# Patient Record
Sex: FEMALE | Race: BLACK OR AFRICAN AMERICAN
[De-identification: names, ages, dates, MRNs, and addresses within clinical notes are randomized per-mention and may not be internally consistent; named-entity substitution may affect disease eponyms.]

---

## 2018-10-30 NOTE — HP
Admitting History and Physical





- Primary Care Physician


PCP: Issa Keith





- Admission


Chief Complaint: right breast cancer


History of Present Illness: 





Patient is a 69 yo female who was noted to have right density at 3 o'clock on 

mammo and US.  Patient underwent a core bx on 2018 which was c/w a 1.2 cm 

invasive ductal ER pos, MT and HER 2 negative.  The MRI done 10/17/2018 was c/w 

known cancer without evidence of multifocal or contralateral dz.  The patient 

is now presenting for right breast we with nl, snbx, poss andx and lympho.  


History Source: Patient





- Past Medical History


Cardiovascular: Yes: Hyperlipdemia





- Smoking History


Smoking history: Never smoked


Have you smoked in the past 12 months: No





- Alcohol/Substance Use


Hx Alcohol Use: No





Home Medications





- Allergies


Allergies/Adverse Reactions: 


 Allergies











Allergy/AdvReac Type Severity Reaction Status Date / Time


 


No Known Allergies Allergy   Verified 10/17/18 10:11














- Home Medications


Home Medications: 


Ambulatory Orders





Ascorbic Acid [Vitamin C] 500 mg PO DAILY 10/25/18 


Aspirin [Adult Aspirin] 81 mg PO DAILY 10/25/18 


Cholecalciferol (Vitamin D3) [Vitamin D] 2,000 unit PO DAILY 10/25/18 


Zinc Gluconate [Zinc] 50 mg PO DAILY 10/25/18 











Family Disease History





- Family Disease History


Family Disease History: CA: Father (prostate ca  70 ), Daughter (breast 

ca 43 now 46 BRCA negative)





Review of Systems





- Review of Systems


Constitutional: reports: No Symptoms


HENT: reports: Ringing in Ears


Cardiovascular: reports: No Symptoms


Respiratory: reports: No Symptoms


Musculoskeletal: reports: Back Pain





Physical Examination


Constitutional: Yes: Well Nourished, Calm


Breast(s): Yes: Other (Ptotic D-cup breast without skin changes or nipple 

discharge. No suspicious masses or adenopathy was noted bilaterally)





Problem List





- Problems


(1) Breast cancer, right


Code(s): C50.911 - MALIGNANT NEOPLASM OF UNSP SITE OF RIGHT FEMALE BREAST   


Qualifiers: 


   Breast location: upper inner quadrant of breast   Estrogen receptor status: 

positive   Patient sex: female   Qualified Code(s): C50.211 - Malignant 

neoplasm of upper-inner quadrant of right female breast; Z17.0 - Estrogen 

receptor positive status [ER+]   





Assessment/Plan





Plan


right breast we with nl, snbx, lympho, possible andx

## 2018-11-01 ENCOUNTER — HOSPITAL ENCOUNTER (OUTPATIENT)
Dept: HOSPITAL 74 - FASU | Age: 68
Discharge: HOME | End: 2018-11-01
Attending: SURGERY
Payer: COMMERCIAL

## 2018-11-01 VITALS — TEMPERATURE: 98.1 F

## 2018-11-01 VITALS — SYSTOLIC BLOOD PRESSURE: 121 MMHG | HEART RATE: 65 BPM | DIASTOLIC BLOOD PRESSURE: 78 MMHG

## 2018-11-01 VITALS — BODY MASS INDEX: 35.5 KG/M2

## 2018-11-01 DIAGNOSIS — C50.211: Primary | ICD-10-CM

## 2018-11-01 DIAGNOSIS — Z17.0: ICD-10-CM

## 2018-11-01 PROCEDURE — 0JX60ZC TRANSFER CHEST SUBCUTANEOUS TISSUE AND FASCIA WITH SKIN, SUBCUTANEOUS TISSUE AND FASCIA, OPEN APPROACH: ICD-10-PCS | Performed by: SURGERY

## 2018-11-01 PROCEDURE — 0HBT0ZZ EXCISION OF RIGHT BREAST, OPEN APPROACH: ICD-10-PCS | Performed by: SURGERY

## 2018-11-01 PROCEDURE — A9541 TC99M SULFUR COLLOID: HCPCS

## 2018-11-01 NOTE — OP
DATE OF OPERATION:  11/01/2018

 

PREOPERATIVE DIAGNOSIS:  Right breast cancer, overlapping regions.

 

POSTOPERATIVE DIAGNOSIS:  Right breast cancer, overlapping regions.

 

PROCEDURES:  Right breast partial mastectomy with mammographic needle localization

and right axillary sentinel lymph node biopsy with tissue transfer closure, 4 x 3 cm.

 

ANESTHESIA:  General laryngeal mask airway anesthesia.

 

PRIMARY SURGEON:  Stu Keith MD

 

FIRST ASSIST:  ALEKS Gracia

 

COMPLICATIONS:  None.

 

Briefly, the patient is a 68-year-old, G5, P5, postmenopausal female with 

American/[place] descent.  She has a family history with her daughter had breast

cancer at age 43 and her sister had ovarian cancer at age 60.  Her father had

prostate cancer.  The patient was found to have a mass on the medial aspect of the

right breast on screening mammography in September 2018.  An ultrasound showed a

1.2-cm density at the right breast 3 o'clock region, 6 cm from the nipple. 

Ultrasound-guided core biopsy showed a moderately differentiated, invasive duct

cancer which was ER positive of 5%, NC negative, and HER2/yuliet negative.  Ki-67 was

10% to 12%.  MRI showed localized disease, and she underwent genetic testing, and the

breast _____ test was negative.  The patient was advised undergoing a right breast

partial mastectomy and sentinel lymph node biopsy.  The patient was brought in for

the procedure on November 1, 2018.  She first underwent the lymphoscintigraphy and

needle localization of the clip at [facility], then was brought to

the [facility].  In the holding area, site verification was made, and

informed consent was obtained.

 

She was brought into the operating room and laid on the OR table in the supine

position.  Venodynes were placed on the lower extremities.  She did not receive any

IV antibiotics given the small nature of the excision.  She underwent general

laryngeal mask airway anesthesia, and the right breast was sterilely prepped and

draped in the usual fashion with the wire prepped in the field.  Timeout was

performed.  Next, 3 mL of Lymphazurin blue were injected intradermally around the

right breast medial para-areolar region, and massage was instituted.

 

The sentinel lymph node biopsy was performed by making an incision just below the

hair-bearing area of the right axilla, and dissection was undertaken and a blue

lymphatic was easily seen coursing to a blue hot lymph node in the level I region of

the right axilla with a 10-second gamma count of 2192.  No other blue or hot nodes

were found, and background count after removal of this node was 18.  Hemostasis was

achieved, and the axillary wound was closed using interrupted 2-0 plain suture.  The

skin was closed using interrupted 3-0 deep dermal Vicryl suture and a running 4-0

subcuticular Biosyn suture.

 

At this point, the wide excision was undertaken around the medial aspect of the right

breast through a curvilinear incision.  Dissection was undertaken around the needle

localization, and the breast tissue was completely removed from around the wire with

the specimen in the middle of the specimen.  The specimen was oriented with a long

lateral/short superior suture, and specimen radiographs showed removal of the clip in

question.  Hemostasis was achieved.  Separate margins were then taken on the

superior, inferior, medial, lateral, deep, and anterior aspects with the suture

marking the biopsy-cavity side.  All these specimens were each sent separately to

Pathology in formalin.  Hemostasis was achieved, and a 4 x 3 cm tissue transfer

closure was accomplished by undermining the breast tissue and bringing it into the

wound and reapproximating the breast tissue using interrupted 2-0 plain suture.  The

skin was closed using interrupted 3-0 deep dermal Vicryl suture and a running 4-0

subcuticular Biosyn suture.  Mastisol and Steri-Strips were applied over the wounds,

and compressive dressing placed over this.  She was placed in a surgical bra

postoperatively.  The patient had laryngeal mask airway removed at the end of the

case and will be recovered in the postanesthesia care unit.  She will be discharged

home the same day once discharge criteria are met.  Estimated blood loss was about 20

mL, and she was hemodynamically stable throughout.  She will follow up in the office

in 1 week for formal wound pathology check.

 

 

STU KEITH M.D.

 

[id number]

DD: 11/01/2018 13:44

DT: 11/01/2018 14:42

Job #:  [id number]

## 2018-11-07 NOTE — PATH
Surgical Pathology Report



Patient Name:  NIRANJAN LUBIN

Accession #:  [id number]

Med. Rec. #:  [id number]                                                        

   /Age/Gender:  1950 (Age: 68) / F

Account:  [id number]                                                          

             Location: [facility] AMBULATORY 

Taken:  2018

Received:  2018

Reported:  2018

Physicians:  Issa Keith M.D.

  



Specimen(s) Received

A: RIGHT AXILLARY SENTINEL LYMPH NODE #1 

B: RIGHT BREAST WIDE EXCISION 

C: RIGHT BREAST ANTERIOR MARGIN 

D: RIGHT BREAST POSTERIOR MARGIN 

E: RIGHT BREAST MEDIAL MARGIN 

F: RIGHT BREAST LATERAL MARGIN 

G: RIGHT BREAST INFERIOR MARGIN 

H: RIGHT BREAST SUPERIOR MARGIN 





Clinical History

Rt breast invasive Ca







Final Diagnosis

A. lymph node, right axillary sentinel #1, excision:

One lymph node, negative for metastatic carcinoma (0/1).



B. breast, right, wide excision:

Invasive ductal carcinoma, moderately differentiated (tubule score: 3/3, nuclear

grade: 2/3, mitotic score: 2/3; total score: 7/9, Adalgisa grade 2).

Invasive carcinoma measures 1.0 cm in greatest dimension, microscopically.

Focal ductal carcinoma in situ (DCIS), solid type, intermediate nuclear grade.

Invasive carcinoma is close to (< 1 mm) the anterior margin.

DCIS is at 1.0 cm from the anterior margin.

see specimens C-H for final margins.

No lymphovascular invasion is identified.

Pathologic stage (pTNM):pT1b pN0.

see also invasive carcinoma case Summary below.



C. breast, right, anterior margin, excision:

Benign breast tissue.



D. breast, right, posterior margin, excision:

Benign breast tissue and skeletal muscle.



E. breast, right, medial margin, excision:

Benign breast tissue.



F. breast, right, lateral margin, excision:

Benign breast tissue.



G. breast, right, inferior margin, excision:

Benign breast tissue.



H. breast, right, superior margin, excision:

Benign breast tissue.



Comments

Breast Invasive Carcinoma: Surgical Pathology Case Summary

(Based on AJCC TNM 8 th edition) 



Procedure 

_X_ Excision (less than total mastectomy)



Specimen Laterality

_X_ Right



Tumor Size 

_X_ Greatest dimension of largest invasive focus >1 mm (millimeters): 10 mm     





Histologic Type

_X_ Invasive carcinoma of no special type (ductal, not otherwise specified)



Histologic Grade (Adalgisa Histologic Score) 



Glandular (Acinar)/Tubular Differentiation

_X_ Score 3 (<10% of tumor area forming glandular/tubular structures)



Nuclear Pleomorphism

     _X_ Score 2 



Mitotic Rate

_X_ Score 2 



Overall Grade

_X_ Grade 2 (scores of 6 or 7) 



Tumor Focality 

 _X_ Single focus of invasive carcinoma



Ductal Carcinoma In Situ (DCIS) 

_X_ DCIS is present in specimen

      _X_ Negative for extensive intraductal component (EIC) 





Margins 

Invasive Carcinoma Margins 

_X_ Uninvolved by invasive carcinoma

     Distance from closest margin (millimeters):  < 1 mm from anterior margin in

wide excision B; final anterior margin C is negative for carcinoma. 



DCIS Margins 

_X_ Uninvolved by DCIS

     Distance from closest margin (millimeters): 1 cm from anterior margin in

wide excision B; final anterior margin C is negative for DCIS. 



Regional Lymph Nodes 

Number of Lymph Nodes with Macrometastases (>2 mm): 0

Number of Lymph Nodes with Micrometastases (>0.2 mm to 2 mm and/or >200 cells):

0

Number of Lymph Nodes with Isolated Tumor Cells (=0.2 mm and =200 cells): 0



Number of Lymph Nodes Examined: 1

Number of [place] Nodes Examined : 1



Treatment Effect 

_X_ No known presurgical therapy



 Lymphovascular Invasion 

 _X_ Not identified



Pathologic Stage Classification (pTNM, AJCC 8th Edition) 

Primary Tumor (Invasive Carcinoma) (pT) 

     _X_ pT1b:     Tumor >5 mm but =10 mm in greatest dimension



Regional Lymph Nodes (pN)

Modifier (required only if applicable)

Category (pN)

     _X_ pN0 (sn):     No regional lymph node metastasis identified or ITCs only

     

Biomarker Studies 

Results of ER and UT studies performed on this specimen (block B4) at [facility] are as follows:

ER (clone [id number] mouse monoclonal antibody by [employer]): 0 % nuclear staining

(Negative).

UT (clone16 mouse monoclonal antibody by [employer]) : 0 % nuclear staining

(Negative).



Results of Her2 (IHC) & Ki-67 studies performed on this specimen (block B4) at

[place], [place] ([id number]) are as follows:

Her2 IHC (EP3 from [employer], formerly known as [id number], using Bond Polymer Refine

detection kit): 0 (Negative).

Ki67: ~20% (intermediate proliferative index).



Positive and negative controls (internal if applicable) show appropriate

results.

Formalin fixation and cold ischemic times are within current ASCO/CAP

recommendations for ER, UT and Her2 testing.



***Electronically Signed***

Trini Rosales M.D.





Gross Description

A. Received in formalin, labeled "right axillary sentinel lymph node #1" is a

2.4 x 0.9 x 0.5 cm lymph node with attached fatty tissue. The lymph node is

bisected and entirely submitted in two cassettes.



B.Received in formalin, labeled "right breast wide excision" is a 7.2 x 5.8 x

2.6 cm portion of fibrofatty tissue with a localizing wire. A long suture

designates the lateral margin and a short suture indicates the superior margin,

per the surgeon. The specimen is inked as follows: anterior-red,

posterior-black, superior-blue, inferior-green, lateral-yellow, medial-orange.

Sectioning reveals a 1.3 x 1.2 x 0.8 cm firm tan mass abutting the anterior

margin, at 1.0 cm from the posterior margin, 1.4 cm from the inferior margin,

1.6 cm from the medial margin and > 2 cm from the superior and lateral margins.

The remainder of the breast tissue is comprised predominantly of adipose tissue.

Representative sections are submitted in nine cassettes as follows: 1-3-mass

with anterior margin; 4-mass with anterior and medial margins, 5-anterior and

lateral margins, 6&7- lateral and posterior margins, 8-inferior margin,

9-superior margin.

Time to formalin fixation: 22 minutes

Total formalin fixation time: approximately 30 hours



C. Received in formalin, labeled "right breast anterior margin" is a 3 x 2.3 x

0.5 cm portion of fibrofatty tissue. A suture marks the biopsy cavity side, per

the surgeon. The margin opposite the suture is inked green. The specimen is

sectioned and entirely submitted in three cassettes.



D. Received in formalin, labeled "right breast posterior margin" is a 2.8 x 1.5

x 0.8 cm portion of fibrofatty tissue. A suture marks the biopsy cavity side,

per the surgeon. The margin opposite the suture is inked green. The specimen is

sectioned and entirely submitted in three cassettes.



E. Received in formalin, labeled "right breast medial margin" is a 2.8 x 1.5 x

0.9 cm portion of fibrofatty tissue. A suture marks the biopsy cavity side, per

the surgeon. The margin opposite the suture is inked green. The specimen is

sectioned and entirely submitted in three cassettes.

 

F. Received in formalin, labeled "right breast lateral margin" is a 3.0 x 1.5 x

0.8 cm portion of fibrofatty tissue. A suture marks the biopsy cavity side, per

the surgeon. The margin opposite the suture is inked green. The specimen is

sectioned and entirely submitted in three cassettes.

 

G. Received in formalin, labeled "right breast inferior margin" is a 3.8 x 2 x

0.9 cm portion of fibrofatty tissue. A suture marks the biopsy cavity side, per

the surgeon. The margin opposite the suture is inked green. The specimen is

sectioned and entirely submitted in four cassettes.



H. Received in formalin, labeled "right breast superior margin" is a 3.0 x 2 x

0.8 cm portion of fibrofatty tissue. A suture marks the biopsy cavity side, per

the surgeon. The margin opposite the suture is inked green. The specimen is

sectioned and entirely submitted in three cassettes.



ebram/[email or web address]

## 2021-03-16 PROBLEM — Z00.00 ENCOUNTER FOR PREVENTIVE HEALTH EXAMINATION: Status: ACTIVE | Noted: 2021-03-16

## 2021-03-24 ENCOUNTER — NON-APPOINTMENT (OUTPATIENT)
Age: 71
End: 2021-03-24

## 2021-03-24 DIAGNOSIS — Z86.39 PERSONAL HISTORY OF OTHER ENDOCRINE, NUTRITIONAL AND METABOLIC DISEASE: ICD-10-CM

## 2021-03-24 DIAGNOSIS — Z85.3 PERSONAL HISTORY OF MALIGNANT NEOPLASM OF BREAST: ICD-10-CM

## 2021-03-24 DIAGNOSIS — Z80.3 FAMILY HISTORY OF MALIGNANT NEOPLASM OF BREAST: ICD-10-CM

## 2021-03-24 DIAGNOSIS — C50.912 MALIGNANT NEOPLASM OF UNSPECIFIED SITE OF LEFT FEMALE BREAST: ICD-10-CM

## 2021-03-24 RX ORDER — ATORVASTATIN CALCIUM 80 MG/1
TABLET, FILM COATED ORAL
Refills: 0 | Status: ACTIVE | COMMUNITY

## 2021-03-24 RX ORDER — ASPIRIN 81 MG
81 TABLET, DELAYED RELEASE (ENTERIC COATED) ORAL
Refills: 0 | Status: ACTIVE | COMMUNITY

## 2021-03-29 ENCOUNTER — APPOINTMENT (OUTPATIENT)
Dept: BREAST CENTER | Facility: CLINIC | Age: 71
End: 2021-03-29
Payer: COMMERCIAL

## 2021-03-29 VITALS
SYSTOLIC BLOOD PRESSURE: 132 MMHG | HEART RATE: 61 BPM | DIASTOLIC BLOOD PRESSURE: 80 MMHG | BODY MASS INDEX: 36 KG/M2 | WEIGHT: 224 LBS | HEIGHT: 66 IN

## 2021-03-29 DIAGNOSIS — Z78.9 OTHER SPECIFIED HEALTH STATUS: ICD-10-CM

## 2021-03-29 DIAGNOSIS — Z80.41 FAMILY HISTORY OF MALIGNANT NEOPLASM OF OVARY: ICD-10-CM

## 2021-03-29 DIAGNOSIS — Z85.3 PERSONAL HISTORY OF MALIGNANT NEOPLASM OF BREAST: ICD-10-CM

## 2021-03-29 DIAGNOSIS — Z80.3 FAMILY HISTORY OF MALIGNANT NEOPLASM OF BREAST: ICD-10-CM

## 2021-03-29 DIAGNOSIS — C50.412 MALIGNANT NEOPLASM OF UPPER-OUTER QUADRANT OF LEFT FEMALE BREAST: ICD-10-CM

## 2021-03-29 DIAGNOSIS — Z80.42 FAMILY HISTORY OF MALIGNANT NEOPLASM OF PROSTATE: ICD-10-CM

## 2021-03-29 PROCEDURE — 99215 OFFICE O/P EST HI 40 MIN: CPT

## 2021-03-29 PROCEDURE — 99072 ADDL SUPL MATRL&STAF TM PHE: CPT

## 2021-03-29 RX ORDER — PROCHLORPERAZINE MALEATE 5 MG/1
TABLET ORAL
Refills: 0 | Status: DISCONTINUED | COMMUNITY
End: 2021-03-29

## 2021-03-29 RX ORDER — EXEMESTANE 25 MG/1
25 TABLET, FILM COATED ORAL
Refills: 0 | Status: ACTIVE | COMMUNITY

## 2021-03-29 NOTE — PAST MEDICAL HISTORY
[Postmenopausal] : The patient is postmenopausal [Menarche Age ____] : age at menarche was [unfilled] [Menopause Age____] : age at menopause was [unfilled] [Total Preg ___] : G[unfilled] [Live Births ___] : P[unfilled]  [Age At Live Birth ___] : Age at live birth: [unfilled] [History of Hormone Replacement Treatment] : has no history of hormone replacement treatment DISPLAY PLAN FREE TEXT DISPLAY PLAN FREE TEXT DISPLAY PLAN FREE TEXT DISPLAY PLAN FREE TEXT

## 2021-03-29 NOTE — HISTORY OF PRESENT ILLNESS
[FreeTextEntry1] : The patient is a 70-year-old G5, P5 postmenopausal female of -American/[place] descent.  She underwent menopause at age 52 and never took any hormone replacement therapy.  She underwent menarche at age 18 and had her first child at age 20.  She has a strong family history with her daughter who had breast cancer at age 43 and tested BRCA negative.  Her sister had ovarian cancer at age 60.  Her father passed away from prostate cancer in his 70s.  The patient underwent routine mammography and was found to have a suspicious area of asymmetry in the medial aspect of the right breast on September 8, 2018.  Diagnostic mammography and ultrasound performed on September 20, 2018 at [facility] showed a persistent density in the inner aspect of the right breast and ultrasound showed an irregular right breast 3:00 density measuring 1.2 cm 6 cm from the nipple.  Ultrasound-guided core biopsy was performed on September 26, 2016 showing a moderately differentiated invasive duct cancer which was ER positive at 5%, ND negative, HER-2/yaw negative by IHC and FISH with a ratio of 1.3 and total count of 2.7.  Ki-67 was 10 to 12%.  She underwent a bilateral breast MRI on October 17, 2018 which showed a localized cancer in the upper inner aspect of the right breast.  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  She underwent a right breast partial mastectomy and sentinel lymph node biopsy on November 1, 2018 and final pathology showed a moderately differentiated invasive duct cancer measuring 1 cm with free margins and 1 negative sentinel lymph node.  The tumor remained ER positive, ND negative, and HER-2/yaw negative.  She was seen by Dr. Vianey Velasquez at [facility] who recommended possible chemotherapy but the patient refused and was initially placed on Arimidex which she did not tolerate and was then placed on Aromasin again and had a reaction but did remain on it.  She underwent hypofractionated radiation through Dr. Glenroy Clemente at [facility] and finished in January 2019.  She was doing well but had a recent mammography and ultrasound which showed a new left breast 2:00 suspicious density and underwent an ultrasound-guided core biopsy at [facility] on March 22, 2021 showing a moderately differentiated intermediate grade invasive duct cancer which is triple negative.  She comes in for surgical evaluation.

## 2021-03-29 NOTE — REASON FOR VISIT
[Follow-Up: _____] : a [unfilled] follow-up visit [FreeTextEntry1] : The patient comes in with a strong family history of breast and ovarian cancer and a personal history of a right breast medial moderately differentiated invasive duct cancer which was diagnosed after a right breast 3:00 ultrasound core biopsy on September 26, 2016 which was ER positive at 5%, AK and HER-2/yaw negative.  [facility] genetic testing was negative and she underwent a right breast partial mastectomy sentinel lymph node biopsy in November 2018 for 1 cm moderately differentiated invasive duct cancer with 1 negative sentinel lymph node making this a stage IA breast cancer.  She was recommended for possible chemotherapy but refused and was initially placed on Arimidex but then switch to exemestane and underwent hypofractionated radiation at [facility].  She comes in now with a  newly diagnosed left breast upper outer quadrant triple negative breast cancer.

## 2021-03-29 NOTE — PHYSICAL EXAM
[Normocephalic] : normocephalic [Atraumatic] : atraumatic [EOMI] : extra ocular movement intact [Supple] : supple [No Supraclavicular Adenopathy] : no supraclavicular adenopathy [No Cervical Adenopathy] : no cervical adenopathy [Examined in the supine and seated position] : examined in the supine and seated position [Breast Mass Right Breast ___cm] : no masses [Breast Mass Left Breast ___cm] : no masses [Breast Nipple Inversion] : nipples not inverted [Breast Nipple Retraction] : nipples not retracted [Breast Nipple Flattening] : nipples not flattened [Breast Nipple Fissures] : nipples not fissured [Breast Abnormal Lactation (Galactorrhea)] : no galactorrhea [Breast Abnormal Secretion Bloody Fluid] : no bloody discharge [Breast Abnormal Secretion Serous Fluid] : no serous discharge [Breast Abnormal Secretion Opalescent Fluid] : no milky discharge [No Axillary Lymphadenopathy] : no left axillary lymphadenopathy [No Edema] : no edema [No Rashes] : no rashes [No Ulceration] : no ulceration [de-identified] : The patient has ptotic D-cup breasts with some mild volume loss on the medial aspect of the right breast from her previous partial mastectomy and radiation.  On palpation I cannot feel any evidence of recurrence in the right breast and no suspicious findings in the left even with close attention to the upper outer quadrant.  She has no axillary, supraclavicular, or cervical adenopathy. [de-identified] : Status post right breast medial partial mastectomy with some mild volume loss but no suspicious masses

## 2021-03-29 NOTE — ASSESSMENT
[FreeTextEntry1] : The patient is a 70-year-old G5, P5 postmenopausal female of -[place] descent.  She underwent menopause at age 52 and never took any hormone replacement therapy.  She underwent menarche at age 18 and had her first child at age 20.  She has a strong family history with her daughter who had breast cancer at age 43 and tested BRCA negative.  Her sister had ovarian cancer at age 60.  Her father passed away from prostate cancer in his 70s.  The patient underwent routine mammography and was found to have a suspicious area of asymmetry in the medial aspect of the right breast on September 8, 2018.  Diagnostic mammography and ultrasound performed on September 20, 2018 at [facility] showed a persistent density in the inner aspect of the right breast and ultrasound showed an irregular right breast 3:00 density measuring 1.2 cm 6 cm from the nipple.  Ultrasound-guided core biopsy was performed on September 26, 2016 showing a moderately differentiated invasive duct cancer which was ER positive at 5%, WY negative, HER-2/yaw negative by IHC and FISH with a ratio of 1.3 and total count of 2.7.  Ki-67 was 10 to 12%.  She underwent a bilateral breast MRI on October 17, 2018 which showed a localized cancer in the upper inner aspect of the right breast.  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  She underwent a right breast partial mastectomy and sentinel lymph node biopsy on November 1, 2018 and final pathology showed a moderately differentiated invasive duct cancer measuring 1 cm with free margins and 1 negative sentinel lymph node.  The tumor remained ER positive, WY negative, and HER-2/yaw negative.  She was seen by Dr. Vianey Velasquez at [facility] who recommended possible chemotherapy but the patient refused and was initially placed on Arimidex which she did not tolerate and was then placed on Aromasin again and had a reaction but did remain on it.  She underwent hypofractionated radiation through Dr. Glenroy Clemente at [facility] and finished in January 2019.  She was doing well but had a recent mammography and ultrasound which showed a new left breast 2:00 suspicious density and underwent an ultrasound-guided core biopsy at [facility] on March 22, 2021 showing a moderately differentiated intermediate grade invasive duct cancer which is triple negative.  On exam, I cannot feel any evidence of recurrence in the right breast and I cannot feel this new cancer in the upper outer aspect of the left breast.  The patient had all her imaging done at [facility] and I do not have access to this.  I spoke to the patient with her daughter on speaker phone regarding the need for an MRI and likely need for partial mastectomy and sentinel lymph node biopsy.  She understands the possible benefit for chemotherapy in this case and she may benefit from a neoadjuvant approach and has an appointment to see a medical oncologist at [facility] tomorrow.  I would likely recommend neoadjuvant chemotherapy in this case and then proceeding to a partial mastectomy and sentinel lymph node biopsy if this is localized.  She understands the need for radiation therapy with any breast conserving operation.  I did speak to her about maintaining her care at [facility] since she does have a difficult time  getting transportation and I referred her to Dr. Rachell Velez.  I told her to have them call me if they need any records sent to them regarding her prior treatments.  She can then just continue her future care at [facility] which is more convenient for her and her family.  She can call me back if she has any further questions or issues and we did go ahead and order the MRI which we done at [facility] and I can follow-up on those results.

## 2021-07-22 ENCOUNTER — NON-APPOINTMENT (OUTPATIENT)
Age: 71
End: 2021-07-22